# Patient Record
Sex: MALE
[De-identification: names, ages, dates, MRNs, and addresses within clinical notes are randomized per-mention and may not be internally consistent; named-entity substitution may affect disease eponyms.]

---

## 2019-03-28 ENCOUNTER — HOSPITAL ENCOUNTER (OUTPATIENT)
Dept: HOSPITAL 5 - SPVIMAG | Age: 79
Discharge: HOME | End: 2019-03-28
Attending: INTERNAL MEDICINE
Payer: MEDICARE

## 2019-03-28 DIAGNOSIS — I10: ICD-10-CM

## 2019-03-28 DIAGNOSIS — Z87.891: ICD-10-CM

## 2019-03-28 DIAGNOSIS — Z90.49: ICD-10-CM

## 2019-03-28 DIAGNOSIS — E78.00: ICD-10-CM

## 2019-03-28 DIAGNOSIS — E11.9: ICD-10-CM

## 2019-03-28 DIAGNOSIS — M19.90: ICD-10-CM

## 2019-03-28 DIAGNOSIS — M17.12: Primary | ICD-10-CM

## 2019-03-28 NOTE — XRAY REPORT
XRAY LEFT KNEE 3 VIEWS: 03/28/19 11:20:00



CLINICAL: Left knee pain.



FINDINGS: Mild osteopenia.  No fracture or dislocation.  Patellofemoral 

joint osteoarthritis with narrowing of the joint space and superior and 

inferior patellar osteophytes.  The medial and lateral joints are 

normal.  No joint effusion.  Surgical clips and vascular calcifications 

in the soft tissues.



IMPRESSION: Patellofemoral joint osteoarthritis.